# Patient Record
Sex: MALE | URBAN - METROPOLITAN AREA
[De-identification: names, ages, dates, MRNs, and addresses within clinical notes are randomized per-mention and may not be internally consistent; named-entity substitution may affect disease eponyms.]

---

## 2024-05-06 ENCOUNTER — HOSPITAL ENCOUNTER (EMERGENCY)
Facility: MEDICAL CENTER | Age: 29
End: 2024-05-06

## 2024-05-06 VITALS
OXYGEN SATURATION: 98 % | WEIGHT: 219.14 LBS | DIASTOLIC BLOOD PRESSURE: 77 MMHG | RESPIRATION RATE: 18 BRPM | HEART RATE: 80 BPM | HEIGHT: 60 IN | TEMPERATURE: 98.5 F | SYSTOLIC BLOOD PRESSURE: 122 MMHG | BODY MASS INDEX: 43.02 KG/M2

## 2024-05-06 ASSESSMENT — PAIN DESCRIPTION - PAIN TYPE: TYPE: ACUTE PAIN

## 2024-05-06 NOTE — ED TRIAGE NOTES
Chief Complaint   Patient presents with    Low Back Pain     X2 hrs, pt states he was lifting up a toy box when the pain started     Pt ambulatory to triage for above complaint, A+O x 4, GCS 15, answering questions appropriately      services used for triage

## 2024-05-06 NOTE — ED NOTES
Per  staff Pt expressed the desire to leave without being seen. It was explained to the pt that he can come back anytime if the pain continues or gets worse. Pt signed the ER AMA Consent form and their wrist band was cut off. Pt ambulated out of the ER of their own.